# Patient Record
Sex: MALE
[De-identification: names, ages, dates, MRNs, and addresses within clinical notes are randomized per-mention and may not be internally consistent; named-entity substitution may affect disease eponyms.]

---

## 2022-09-02 ENCOUNTER — RX ONLY (RX ONLY)
Age: 47
End: 2022-09-02

## 2022-09-02 ENCOUNTER — OFFICE (OUTPATIENT)
Dept: URBAN - METROPOLITAN AREA CLINIC 92 | Facility: CLINIC | Age: 47
Setting detail: OPHTHALMOLOGY
End: 2022-09-02
Payer: COMMERCIAL

## 2022-09-02 DIAGNOSIS — H52.4: ICD-10-CM

## 2022-09-02 DIAGNOSIS — H25.13: ICD-10-CM

## 2022-09-02 PROBLEM — H52.7 REFRACTIVE ERROR: Status: ACTIVE | Noted: 2022-09-02

## 2022-09-02 PROCEDURE — 92250 FUNDUS PHOTOGRAPHY W/I&R: CPT | Performed by: SPECIALIST

## 2022-09-02 PROCEDURE — 92015 DETERMINE REFRACTIVE STATE: CPT | Performed by: SPECIALIST

## 2022-09-02 PROCEDURE — 92004 COMPRE OPH EXAM NEW PT 1/>: CPT | Performed by: SPECIALIST

## 2022-09-02 PROCEDURE — 92134 CPTRZ OPH DX IMG PST SGM RTA: CPT | Performed by: SPECIALIST

## 2022-09-02 ASSESSMENT — CONFRONTATIONAL VISUAL FIELD TEST (CVF)
OS_FINDINGS: FULL
OD_FINDINGS: FULL

## 2022-09-02 ASSESSMENT — REFRACTION_MANIFEST
OD_SPHERE: -2.25
OS_CYLINDER: +0.75
OS_AXIS: 073
OS_ADD: +1.25
OD_ADD: +1.25
OS_SPHERE: -2.25
OD_CYLINDER: +0.75
OD_AXIS: 103

## 2022-09-02 ASSESSMENT — VISUAL ACUITY
OS_BCVA: 20/20-
OD_BCVA: 20/20-2

## 2022-09-02 ASSESSMENT — TONOMETRY
OD_IOP_MMHG: 16
OS_IOP_MMHG: 16

## 2022-09-02 ASSESSMENT — REFRACTION_CURRENTRX
OS_CYLINDER: SPH
OS_OVR_VA: 20/
OS_VPRISM_DIRECTION: SV
OD_CYLINDER: SPH
OD_CYLINDER: SPH
OD_OVR_VA: 20/
OS_SPHERE: -1.50
OD_VPRISM_DIRECTION: SV
OS_SPHERE: -1.50
OD_SPHERE: -1.50
OS_VPRISM_DIRECTION: SV
OD_SPHERE: -1.50
OD_VPRISM_DIRECTION: SV
OS_OVR_VA: 20/
OD_OVR_VA: 20/
OS_CYLINDER: SPH

## 2022-09-02 ASSESSMENT — SPHEQUIV_DERIVED
OS_SPHEQUIV: -2.625
OD_SPHEQUIV: -1.875
OD_SPHEQUIV: -2.125
OS_SPHEQUIV: -1.875

## 2022-09-02 ASSESSMENT — KERATOMETRY
OS_K1POWER_DIOPTERS: 44.25
OS_AXISANGLE_DEGREES: 077
OD_AXISANGLE_DEGREES: 089
OD_K2POWER_DIOPTERS: 45.00
METHOD_AUTO_MANUAL: AUTO
OD_K1POWER_DIOPTERS: 44.00
OS_K2POWER_DIOPTERS: 46.00

## 2022-09-02 ASSESSMENT — REFRACTION_AUTOREFRACTION
OS_SPHERE: -3.50
OD_CYLINDER: +0.75
OD_AXIS: 104
OD_SPHERE: -2.50
OS_CYLINDER: +1.75
OS_AXIS: 073

## 2022-09-02 ASSESSMENT — AXIALLENGTH_DERIVED
OD_AL: 23.9608
OS_AL: 23.7263
OD_AL: 24.0617
OS_AL: 24.0256

## 2024-07-26 ENCOUNTER — APPOINTMENT (OUTPATIENT)
Dept: DERMATOLOGY | Facility: CLINIC | Age: 49
End: 2024-07-26
Payer: COMMERCIAL

## 2024-07-26 DIAGNOSIS — Z41.1 ENCOUNTER FOR COSMETIC SURGERY: ICD-10-CM

## 2024-07-26 PROBLEM — Z00.00 ENCOUNTER FOR PREVENTIVE HEALTH EXAMINATION: Status: ACTIVE | Noted: 2024-07-26

## 2024-07-26 PROCEDURE — D0125: CPT

## 2024-07-26 NOTE — ASSESSMENT
[FreeTextEntry1] : # Acrochordons Discussed benign nature of this type of lesion.  New lesions may continue to appear.   Options discussed, he elects to proceed with cosmetic snip excision today.   Snip excision Procedure Note.  Location(s): neck  After obtaining verbal consent, including counseling on risks of dyspigmentation and scarring, treatment area cleaned with alcohol wipe. 6 skin tags were removed at the base with gradle scissors and jewelers. The patient tolerated the procedure well. wound care reviewed   Self pay-  total charge: $200 Patient paid at registration.   Do not bill.

## 2024-07-26 NOTE — HISTORY OF PRESENT ILLNESS
[FreeTextEntry1] : NPV- skin tag removal [de-identified] : Jonh Hsu is a 47yo M presenting for skin tags.   Around neck. Would like removed.   PMH -no history of skin cancer  FHx  -No family history of melanoma

## 2024-07-26 NOTE — PHYSICAL EXAM
[Alert] : alert [Oriented x 3] : ~L oriented x 3 [Well Nourished] : well nourished [Conjunctiva Non-injected] : conjunctiva non-injected [No Visual Lymphadenopathy] : no visual  lymphadenopathy [No Clubbing] : no clubbing [No Edema] : no edema [No Bromhidrosis] : no bromhidrosis [No Chromhidrosis] : no chromhidrosis [FreeTextEntry3] : Involving the neck are pedunculated flesh-colored, soft papules.